# Patient Record
Sex: MALE | Race: WHITE | Employment: FULL TIME | URBAN - NONMETROPOLITAN AREA
[De-identification: names, ages, dates, MRNs, and addresses within clinical notes are randomized per-mention and may not be internally consistent; named-entity substitution may affect disease eponyms.]

---

## 2017-02-10 ENCOUNTER — OFFICE VISIT (OUTPATIENT)
Dept: OTHER | Age: 27
End: 2017-02-10

## 2017-02-10 ENCOUNTER — HOSPITAL ENCOUNTER (OUTPATIENT)
Dept: OTHER | Age: 27
Discharge: HOME | End: 2017-02-10
Attending: NURSE PRACTITIONER | Admitting: NURSE PRACTITIONER

## 2017-02-10 VITALS — OXYGEN SATURATION: 98 % | HEART RATE: 101 BPM | DIASTOLIC BLOOD PRESSURE: 57 MMHG | SYSTOLIC BLOOD PRESSURE: 129 MMHG

## 2017-02-10 DIAGNOSIS — E66.01 MORBID OBESITY WITH BODY MASS INDEX (BMI) OF 50.0 TO 59.9 IN ADULT (HCC): Primary | ICD-10-CM

## 2017-02-10 DIAGNOSIS — R06.83 SNORING: ICD-10-CM

## 2017-02-10 DIAGNOSIS — Z00.00 ENCOUNTER FOR PREVENTIVE HEALTH EXAMINATION: ICD-10-CM

## 2017-02-10 PROCEDURE — 99205 OFFICE O/P NEW HI 60 MIN: CPT | Performed by: NURSE PRACTITIONER

## 2017-02-10 ASSESSMENT — ENCOUNTER SYMPTOMS
DIARRHEA: 0
EYE REDNESS: 0
NAUSEA: 0
EYE ITCHING: 0
TROUBLE SWALLOWING: 0
STRIDOR: 0
VOMITING: 0
SINUS PRESSURE: 0
ABDOMINAL PAIN: 0
RESPIRATORY NEGATIVE: 1
CONSTIPATION: 0
SORE THROAT: 0
BACK PAIN: 1
GASTROINTESTINAL NEGATIVE: 1
EYES NEGATIVE: 1
COUGH: 0
WHEEZING: 0
EYE PAIN: 0
EYE DISCHARGE: 0

## 2017-03-20 ENCOUNTER — TELEPHONE (OUTPATIENT)
Dept: OTHER | Age: 27
End: 2017-03-20

## 2017-03-20 DIAGNOSIS — E66.01 MORBID OBESITY, UNSPECIFIED OBESITY TYPE (HCC): Primary | ICD-10-CM

## 2017-03-20 DIAGNOSIS — E66.01 MORBID OBESITY WITH BODY MASS INDEX (BMI) OF 50.0 TO 59.9 IN ADULT (HCC): ICD-10-CM

## 2017-03-21 ENCOUNTER — HOSPITAL ENCOUNTER (OUTPATIENT)
Dept: LAB | Age: 27
Discharge: HOME | End: 2017-03-21
Attending: NURSE PRACTITIONER | Admitting: NURSE PRACTITIONER

## 2017-03-21 LAB
ALBUMIN SERPL-MCNC: 4 GM/DL (ref 3.4–5)
ALP BLD-CCNC: 83 IU/L (ref 40–129)
ALT SERPL-CCNC: 80 U/L (ref 10–40)
ANION GAP SERPL CALCULATED.3IONS-SCNC: 12 MMOL/L (ref 4–16)
AST SERPL-CCNC: 50 IU/L (ref 15–37)
BASOPHILS ABSOLUTE: 0 K/CU MM
BASOPHILS RELATIVE PERCENT: 0.5 % (ref 0–1)
BILIRUB SERPL-MCNC: 0.5 MG/DL (ref 0–1)
BUN BLDV-MCNC: 8 MG/DL (ref 6–23)
CALCIUM SERPL-MCNC: 9 MG/DL (ref 8.3–10.6)
CHLORIDE BLD-SCNC: 102 MMOL/L (ref 99–110)
CHOLESTEROL: 168 MG/DL
CO2: 24 MMOL/L (ref 21–32)
CREAT SERPL-MCNC: 0.8 MG/DL (ref 0.9–1.3)
DIFFERENTIAL TYPE: ABNORMAL
EOSINOPHILS ABSOLUTE: 0.2 K/CU MM
EOSINOPHILS RELATIVE PERCENT: 2.8 % (ref 0–3)
ESTIMATED AVERAGE GLUCOSE: 117 MG/DL
GFR AFRICAN AMERICAN: >60 ML/MIN/1.73M2
GFR NON-AFRICAN AMERICAN: >60 ML/MIN/1.73M2
GLUCOSE BLD-MCNC: 96 MG/DL (ref 70–140)
HBA1C MFR BLD: 5.7 % (ref 4.2–6.3)
HCT VFR BLD CALC: 44.2 % (ref 42–52)
HDLC SERPL-MCNC: 36 MG/DL
HEMOGLOBIN: 14 GM/DL (ref 13.5–18)
HIV SCREEN: NON REACTIVE
IMMATURE NEUTROPHIL %: 0.5 % (ref 0–0.43)
LDL CHOLESTEROL DIRECT: 85 MG/DL
LYMPHOCYTES ABSOLUTE: 2.2 K/CU MM
LYMPHOCYTES RELATIVE PERCENT: 38.1 % (ref 24–44)
MCH RBC QN AUTO: 29.3 PG (ref 27–31)
MCHC RBC AUTO-ENTMCNC: 31.7 % (ref 32–36)
MCV RBC AUTO: 92.5 FL (ref 78–100)
MONOCYTES ABSOLUTE: 0.4 K/CU MM
MONOCYTES RELATIVE PERCENT: 6.9 % (ref 0–4)
PDW BLD-RTO: 13.5 % (ref 11.7–14.9)
PLATELET # BLD: 185 K/CU MM (ref 140–440)
PMV BLD AUTO: 10.7 FL (ref 7.5–11.1)
POTASSIUM SERPL-SCNC: 4.4 MMOL/L (ref 3.5–5.1)
RBC # BLD: 4.78 M/CU MM (ref 4.6–6.2)
SEGMENTED NEUTROPHILS ABSOLUTE COUNT: 2.9 K/CU MM
SEGMENTED NEUTROPHILS RELATIVE PERCENT: 51.2 % (ref 36–66)
SODIUM BLD-SCNC: 138 MMOL/L (ref 135–145)
T3 FREE: 3.3 PG/ML (ref 2.3–4.2)
T4 FREE: 1.09 NG/DL (ref 0.9–1.8)
TOTAL IMMATURE NEUTOROPHIL: 0.03 K/CU MM
TOTAL PROTEIN: 7.3 GM/DL (ref 6.4–8.2)
TRIGL SERPL-MCNC: 239 MG/DL
TSH HIGH SENSITIVITY: 2.52 UIU/ML (ref 0.27–4.2)
WBC # BLD: 5.6 K/CU MM (ref 4–10.5)

## 2017-03-23 ENCOUNTER — HOSPITAL ENCOUNTER (OUTPATIENT)
Dept: OTHER | Age: 27
Discharge: HOME | End: 2017-03-23
Attending: NURSE PRACTITIONER | Admitting: NURSE PRACTITIONER

## 2017-03-23 ENCOUNTER — OFFICE VISIT (OUTPATIENT)
Dept: OTHER | Age: 27
End: 2017-03-23

## 2017-03-23 VITALS — OXYGEN SATURATION: 96 % | DIASTOLIC BLOOD PRESSURE: 63 MMHG | SYSTOLIC BLOOD PRESSURE: 135 MMHG | HEART RATE: 84 BPM

## 2017-03-23 DIAGNOSIS — L03.115 CELLULITIS OF RIGHT LOWER EXTREMITY: ICD-10-CM

## 2017-03-23 DIAGNOSIS — R74.8 ELEVATED LIVER ENZYMES: Primary | ICD-10-CM

## 2017-03-23 DIAGNOSIS — E78.5 DYSLIPIDEMIA: ICD-10-CM

## 2017-03-23 DIAGNOSIS — Z71.2 ENCOUNTER TO DISCUSS TEST RESULTS: ICD-10-CM

## 2017-03-23 PROCEDURE — 99214 OFFICE O/P EST MOD 30 MIN: CPT | Performed by: NURSE PRACTITIONER

## 2017-03-23 RX ORDER — AMOXICILLIN AND CLAVULANATE POTASSIUM 875; 125 MG/1; MG/1
1 TABLET, FILM COATED ORAL 2 TIMES DAILY
Qty: 20 TABLET | Refills: 0 | Status: SHIPPED | OUTPATIENT
Start: 2017-03-23 | End: 2017-04-02

## 2017-03-23 ASSESSMENT — ENCOUNTER SYMPTOMS
GASTROINTESTINAL NEGATIVE: 1
RESPIRATORY NEGATIVE: 1
EYES NEGATIVE: 1
ALLERGIC/IMMUNOLOGIC NEGATIVE: 1
COLOR CHANGE: 1

## 2017-03-30 ENCOUNTER — HOSPITAL ENCOUNTER (OUTPATIENT)
Dept: CT IMAGING | Age: 27
Discharge: HOME | End: 2017-03-30
Attending: NURSE PRACTITIONER | Admitting: NURSE PRACTITIONER

## 2017-03-30 ENCOUNTER — TELEPHONE (OUTPATIENT)
Dept: OTHER | Age: 27
End: 2017-03-30

## 2017-03-30 DIAGNOSIS — R74.8 ABNORMAL LEVELS OF OTHER SERUM ENZYMES: ICD-10-CM

## 2017-03-30 DIAGNOSIS — R74.8 OTHER NONSPECIFIC ABNORMAL SERUM ENZYME LEVELS: ICD-10-CM

## 2019-05-16 ENCOUNTER — HOSPITAL ENCOUNTER (OUTPATIENT)
Age: 29
Discharge: HOME | End: 2019-05-16

## 2019-05-16 ENCOUNTER — HOSPITAL ENCOUNTER (OUTPATIENT)
Dept: GENERAL RADIOLOGY | Age: 29
Discharge: HOME | End: 2019-05-16

## 2019-05-16 DIAGNOSIS — S93.401A SPRAIN OF RIGHT ANKLE, UNSPECIFIED LIGAMENT, INITIAL ENCOUNTER: ICD-10-CM

## 2019-05-16 PROCEDURE — 73610 X-RAY EXAM OF ANKLE: CPT

## 2020-12-01 ENCOUNTER — HOSPITAL ENCOUNTER (OUTPATIENT)
Age: 30
Setting detail: OBSERVATION
Discharge: HOME | End: 2020-12-02
Attending: INTERNAL MEDICINE | Admitting: INTERNAL MEDICINE
Payer: COMMERCIAL

## 2020-12-01 ENCOUNTER — APPOINTMENT (OUTPATIENT)
Dept: GENERAL RADIOLOGY | Age: 30
End: 2020-12-01
Payer: COMMERCIAL

## 2020-12-01 PROBLEM — M25.561 RIGHT KNEE PAIN: Status: ACTIVE | Noted: 2020-12-01

## 2020-12-01 LAB
ALBUMIN SERPL-MCNC: 3.9 GM/DL (ref 3.4–5)
ALP BLD-CCNC: 96 IU/L (ref 40–129)
ALT SERPL-CCNC: 60 U/L (ref 10–40)
ANION GAP SERPL CALCULATED.3IONS-SCNC: 12 MMOL/L (ref 4–16)
AST SERPL-CCNC: 37 IU/L (ref 15–37)
BASOPHILS ABSOLUTE: 0.1 K/CU MM
BASOPHILS RELATIVE PERCENT: 0.6 % (ref 0–1)
BILIRUB SERPL-MCNC: 0.6 MG/DL (ref 0–1)
BUN BLDV-MCNC: 11 MG/DL (ref 6–23)
CALCIUM SERPL-MCNC: 9 MG/DL (ref 8.3–10.6)
CHLORIDE BLD-SCNC: 99 MMOL/L (ref 99–110)
CO2: 25 MMOL/L (ref 21–32)
CREAT SERPL-MCNC: 0.9 MG/DL (ref 0.9–1.3)
DIFFERENTIAL TYPE: ABNORMAL
EOSINOPHILS ABSOLUTE: 0.3 K/CU MM
EOSINOPHILS RELATIVE PERCENT: 3.1 % (ref 0–3)
GFR AFRICAN AMERICAN: >60 ML/MIN/1.73M2
GFR NON-AFRICAN AMERICAN: >60 ML/MIN/1.73M2
GLUCOSE BLD-MCNC: 98 MG/DL (ref 70–99)
HCT VFR BLD CALC: 45 % (ref 42–52)
HEMOGLOBIN: 14.3 GM/DL (ref 13.5–18)
IMMATURE NEUTROPHIL %: 0.6 % (ref 0–0.43)
LYMPHOCYTES ABSOLUTE: 2.5 K/CU MM
LYMPHOCYTES RELATIVE PERCENT: 27.8 % (ref 24–44)
MCH RBC QN AUTO: 29.4 PG (ref 27–31)
MCHC RBC AUTO-ENTMCNC: 31.8 % (ref 32–36)
MCV RBC AUTO: 92.6 FL (ref 78–100)
MONOCYTES ABSOLUTE: 0.6 K/CU MM
MONOCYTES RELATIVE PERCENT: 6.8 % (ref 0–4)
NUCLEATED RBC %: 0 %
PDW BLD-RTO: 13.1 % (ref 11.7–14.9)
PLATELET # BLD: 222 K/CU MM (ref 140–440)
PMV BLD AUTO: 11 FL (ref 7.5–11.1)
POTASSIUM SERPL-SCNC: 4.1 MMOL/L (ref 3.5–5.1)
RBC # BLD: 4.86 M/CU MM (ref 4.6–6.2)
SEGMENTED NEUTROPHILS ABSOLUTE COUNT: 5.5 K/CU MM
SEGMENTED NEUTROPHILS RELATIVE PERCENT: 61.1 % (ref 36–66)
SODIUM BLD-SCNC: 136 MMOL/L (ref 135–145)
TOTAL IMMATURE NEUTOROPHIL: 0.05 K/CU MM
TOTAL NUCLEATED RBC: 0 K/CU MM
TOTAL PROTEIN: 7.3 GM/DL (ref 6.4–8.2)
WBC # BLD: 9 K/CU MM (ref 4–10.5)

## 2020-12-01 PROCEDURE — 85025 COMPLETE CBC W/AUTO DIFF WBC: CPT

## 2020-12-01 PROCEDURE — 6370000000 HC RX 637 (ALT 250 FOR IP): Performed by: PHYSICIAN ASSISTANT

## 2020-12-01 PROCEDURE — 6360000002 HC RX W HCPCS: Performed by: PHYSICIAN ASSISTANT

## 2020-12-01 PROCEDURE — 80053 COMPREHEN METABOLIC PANEL: CPT

## 2020-12-01 PROCEDURE — 73564 X-RAY EXAM KNEE 4 OR MORE: CPT

## 2020-12-01 PROCEDURE — G0378 HOSPITAL OBSERVATION PER HR: HCPCS

## 2020-12-01 PROCEDURE — 96372 THER/PROPH/DIAG INJ SC/IM: CPT

## 2020-12-01 PROCEDURE — 99285 EMERGENCY DEPT VISIT HI MDM: CPT

## 2020-12-01 PROCEDURE — 73590 X-RAY EXAM OF LOWER LEG: CPT

## 2020-12-01 RX ORDER — IBUPROFEN 400 MG/1
400 TABLET ORAL EVERY 6 HOURS PRN
Status: DISCONTINUED | OUTPATIENT
Start: 2020-12-01 | End: 2020-12-02 | Stop reason: HOSPADM

## 2020-12-01 RX ORDER — HYDROCODONE BITARTRATE AND ACETAMINOPHEN 5; 325 MG/1; MG/1
2 TABLET ORAL EVERY 4 HOURS PRN
Status: DISCONTINUED | OUTPATIENT
Start: 2020-12-01 | End: 2020-12-02 | Stop reason: HOSPADM

## 2020-12-01 RX ORDER — LIDOCAINE 4 G/G
1 PATCH TOPICAL ONCE
Status: COMPLETED | OUTPATIENT
Start: 2020-12-01 | End: 2020-12-02

## 2020-12-01 RX ORDER — POLYETHYLENE GLYCOL 3350 17 G/17G
17 POWDER, FOR SOLUTION ORAL DAILY PRN
Status: DISCONTINUED | OUTPATIENT
Start: 2020-12-01 | End: 2020-12-02 | Stop reason: HOSPADM

## 2020-12-01 RX ORDER — LIDOCAINE 4 G/G
1 PATCH TOPICAL DAILY
Status: DISCONTINUED | OUTPATIENT
Start: 2020-12-01 | End: 2020-12-02 | Stop reason: HOSPADM

## 2020-12-01 RX ORDER — HYDROCODONE BITARTRATE AND ACETAMINOPHEN 5; 325 MG/1; MG/1
1 TABLET ORAL EVERY 4 HOURS PRN
Status: DISCONTINUED | OUTPATIENT
Start: 2020-12-01 | End: 2020-12-02 | Stop reason: HOSPADM

## 2020-12-01 RX ORDER — ACETAMINOPHEN 650 MG/1
650 SUPPOSITORY RECTAL EVERY 6 HOURS PRN
Status: DISCONTINUED | OUTPATIENT
Start: 2020-12-01 | End: 2020-12-02 | Stop reason: HOSPADM

## 2020-12-01 RX ORDER — KETOROLAC TROMETHAMINE 30 MG/ML
30 INJECTION, SOLUTION INTRAMUSCULAR; INTRAVENOUS ONCE
Status: COMPLETED | OUTPATIENT
Start: 2020-12-01 | End: 2020-12-01

## 2020-12-01 RX ORDER — PROMETHAZINE HYDROCHLORIDE 25 MG/1
12.5 TABLET ORAL EVERY 6 HOURS PRN
Status: DISCONTINUED | OUTPATIENT
Start: 2020-12-01 | End: 2020-12-02 | Stop reason: HOSPADM

## 2020-12-01 RX ORDER — HYDROCODONE BITARTRATE AND ACETAMINOPHEN 5; 325 MG/1; MG/1
1 TABLET ORAL ONCE
Status: COMPLETED | OUTPATIENT
Start: 2020-12-01 | End: 2020-12-01

## 2020-12-01 RX ORDER — ACETAMINOPHEN 325 MG/1
650 TABLET ORAL EVERY 6 HOURS PRN
Status: DISCONTINUED | OUTPATIENT
Start: 2020-12-01 | End: 2020-12-02 | Stop reason: HOSPADM

## 2020-12-01 RX ORDER — ONDANSETRON 2 MG/ML
4 INJECTION INTRAMUSCULAR; INTRAVENOUS EVERY 6 HOURS PRN
Status: DISCONTINUED | OUTPATIENT
Start: 2020-12-01 | End: 2020-12-02 | Stop reason: HOSPADM

## 2020-12-01 RX ADMIN — HYDROCODONE BITARTRATE AND ACETAMINOPHEN 1 TABLET: 5; 325 TABLET ORAL at 20:17

## 2020-12-01 RX ADMIN — KETOROLAC TROMETHAMINE 30 MG: 30 INJECTION, SOLUTION INTRAMUSCULAR; INTRAVENOUS at 19:03

## 2020-12-01 ASSESSMENT — PAIN SCALES - GENERAL
PAINLEVEL_OUTOF10: 10

## 2020-12-01 ASSESSMENT — PAIN DESCRIPTION - PAIN TYPE: TYPE: ACUTE PAIN

## 2020-12-01 ASSESSMENT — PAIN DESCRIPTION - LOCATION: LOCATION: KNEE

## 2020-12-01 ASSESSMENT — PAIN DESCRIPTION - ORIENTATION: ORIENTATION: RIGHT

## 2020-12-01 NOTE — ED PROVIDER NOTES
EMERGENCY DEPARTMENT ENCOUNTER      PCP: JENIFER Greer CNP    CHIEF COMPLAINT    Chief Complaint   Patient presents with    Fall     slipped on ice yesterday     Knee Pain     right        This patient was not evaluated by the attending physician. I have independently evaluated this patient. My supervising physician was available for consultation. HPI    Kirstin Ko is a 30 y.o. male who presents with Right knee pain after fall. Onset was yesterday. The context is patient reports he slipped on ice yesterday landing on both his knees. He was able to get up on his own after the fall. He denies hitting his head and denies having loss of consciousness. Patient reports that although he struck both knees he only is having pain in his right knee. Fall occurred yesterday afternoon. Patient reports that pain has been localized to the front and right side of right knee but does occasionally radiate into the mid shin region of his right lower leg. He has not tried any medication for pain. Duration of pain is been constant since onset. Today he was sitting most of the day when he tried to get up to walk around noon she was unable to walk and is only able to bear weight for small amount of time on his own. He called EMS and came to the ED for further evaluation. Patient denies prior history of right knee injury. The pain is aggravated by trying to ambulate and knee movement. The patient has no associated other injuries. Patient denies numbness, weakness, tingling, redness, fever, chills. Patient denies taking blood thinners. REVIEW OF SYSTEMS    General: Denies fever or chills  Cardiac: Denies chest pain  Pulmonary: Denies shortness of breath  GI: Denies abdominal pain, vomiting, or diarrhea  : No dysuria or hematuria  Musculoskeletal: See HPI; Denies any other musculoskeletal injuries, including chest wall and back.     All other review of systems are negative  See HPI and nursing notes for additional information     PAST MEDICAL & SURGICAL HISTORY    Past Medical History:   Diagnosis Date    Abscess     ADHD (attention deficit hyperactivity disorder)     Chronic back pain     Morbid obesity ([address]     Scoliosis     6th grade     Past Surgical History:   Procedure Laterality Date    FOOT SURGERY         CURRENT MEDICATIONS    Current Outpatient Rx   Medication Sig Dispense Refill    naproxen (NAPROSYN) 500 MG tablet Take 1 tablet by mouth 2 times daily 60 tablet 0       ALLERGIES    Allergies   Allergen Reactions    Cinnamon Shortness Of Breath       SOCIAL & FAMILY HISTORY    Social History     Socioeconomic History    Marital status: Single     Spouse name: None    Number of children: None    Years of education: None    Highest education level: None   Occupational History    None   Social Needs    Financial resource strain: None    Food insecurity     Worry: None     Inability: None    Transportation needs     Medical: None     Non-medical: None   Tobacco Use    Smoking status: Never Smoker    Smokeless tobacco: Current User     Types: Snuff   Substance and Sexual Activity    Alcohol use: Yes     Comment: occ    Drug use: No    Sexual activity: Yes     Partners: Female   Lifestyle    Physical activity     Days per week: None     Minutes per session: None    Stress: None   Relationships    Social connections     Talks on phone: None     Gets together: None     Attends Confucianist service: None     Active member of club or organization: None     Attends meetings of clubs or organizations: None     Relationship status: None    Intimate partner violence     Fear of current or ex partner: None     Emotionally abused: None     Physically abused: None     Forced sexual activity: None   Other Topics Concern    None   Social History Narrative    None     Family History   Problem Relation Age of Onset    Hypertension Mother     Obesity Mother         had gastric bypass     Diabetes Father     Obesity Father     Asthma Brother     High Blood Pressure Maternal Grandmother     Diabetes Maternal Grandfather     Arthritis Maternal Grandfather     Stroke Paternal Grandmother     Heart Attack Paternal Grandmother     No Known Problems Paternal Grandfather            PHYSICAL EXAM    VITAL SIGNS: /82   Pulse 92   Temp 97.2 °F (36.2 °C)   Resp 20   Ht 6' 5\" (1.956 m)   Wt (!) 540 lb (244.9 kg)   SpO2 96%   BMI 64.03 kg/m²   Constitutional:  Well developed, Appears comfortable    Musculoskeletal:    Right knee exam:   -Inspection:  No obvious defects or deformities compared to contralateral knee, skin intact   - Palpation: No redness, or warmth     No palpable effusion but exam is difficult secondary to patient's body habitus     + lateral joint line tenderness to palpation. No medial joint line tenderness to palpation. + parapatellar and patellar tenderness to palpation. No pes region, popliteal tenderness to palpation. There is also mild tenderness to palpation of the proximal right tibia and fibula without point tenderness of the tibia or fibula. There is no mid to distal tibia or fibula tenderness to palpation. -ROM:  Extension - Has full extension (Extensor mechanism intact)    Flexion -significantly limited due to pain   -Provocative tests: Unable to perform anterior/posterior drawer and varus valgus laxity testing as well as Urmila's test due to patient's decreased range of motion. No swelling, discoloration, tenderness to palpation of lower leg, or range of motion deficit of the ipsilateral hip and ankle. Compartments are soft in affected extremity. Strength 5/5 in affected extremity. Affected extremity is distally neurovascularly intact. Cardiovascular: Regular rate  Respiratory: No tachypnea. No retractions. No distress. Vascular: Distal pulses (DP, PT) intact on affected side. Capillary refill intact.   Integument:  Well hydrated, no rash COURSE & MEDICAL DECISION MAKING       Vital signs and nursing notes reviewed during ED course. I have independently evaluated this patient. Supervising physician present in the Emergency Department, available for consultation, throughout entirety of patient care. History and exam is consistent with musculoskeletal pain of right knee after mechanical fall likely internal derangement vs contusion. While in the ED today, right knee xray and right tibia and fibula x-ray were obtained and reveal no acute osseous abnormality. While in the ED, patient received IM Toradol, topical lidocaine patch, and oral Norco with some improvement in pain but is still unable to ambulate. Affected extremity is distally neurovascularly intact. I have low clinical suspicion for septic joint, nerve injury, vascular injury. Compartments are soft in the right lower extremity-low clinical suspicion for compartment syndrome. Presentation not consistent with DVT. Case management consulted as patient unable to ambulate in the ED to try to help with discharge planning as due to patient's weight he is unable to utilize crutches or walker due to weight limit. Case management is unable to obtain a wheelchair for patient tonight and recommends admission. Labs without emergent findings. I consulted hospitalist, Dr. Andres Schultz, and we discussed the patient's case. He agrees to admit the patient at this time for further care. All pertinent Lab data and radiographic results reviewed with patient at bedside. The patient and/or the family were informed of the results of any tests/labs/imaging, the treatment plan, and time was allotted to answer questions. Diagnosis, disposition, and plan discussed in detail with patient and/or family who understands and agrees to admission at this time.       In consideration of current COVID19 pandemic, with effort to minimize unnecessary provider exposure, this patient was seen at bedside by me independently. However, in compliance with current hospital KARLA/ED protocol, prior to admission I did discuss this patient case with emergency department physician, Dr. Anthony Tejada. Of note, this Pt was NOT admitted to the ICU. I did don/doff appropriate PPE (including face mask, safety glasses, gloves), as recommended by the health facility/national standard best practice, during my bedside interactions with the patient. Clinical  IMPRESSION    1. Acute pain of right knee    2. Fall due to slipping on ice or snow, initial encounter    3. Unable to ambulate          Disposition:  Admission    Comment: Please note this report has been produced using speech recognition software and may contain errors related to that system including errors in grammar, punctuation, and spelling, as well as words and phrases that may be inappropriate. If there are any questions or concerns please feel free to contact the dictating provider for clarification.           Germain Vazquez PA-C  12/01/20 6909

## 2020-12-02 VITALS
DIASTOLIC BLOOD PRESSURE: 65 MMHG | RESPIRATION RATE: 18 BRPM | TEMPERATURE: 97.7 F | HEIGHT: 77 IN | SYSTOLIC BLOOD PRESSURE: 140 MMHG | WEIGHT: 315 LBS | BODY MASS INDEX: 37.19 KG/M2 | OXYGEN SATURATION: 96 % | HEART RATE: 87 BPM

## 2020-12-02 PROBLEM — R26.2 AMBULATORY DYSFUNCTION: Status: ACTIVE | Noted: 2020-12-02

## 2020-12-02 PROCEDURE — 6370000000 HC RX 637 (ALT 250 FOR IP): Performed by: STUDENT IN AN ORGANIZED HEALTH CARE EDUCATION/TRAINING PROGRAM

## 2020-12-02 PROCEDURE — 97162 PT EVAL MOD COMPLEX 30 MIN: CPT

## 2020-12-02 PROCEDURE — 97166 OT EVAL MOD COMPLEX 45 MIN: CPT

## 2020-12-02 PROCEDURE — 97116 GAIT TRAINING THERAPY: CPT

## 2020-12-02 PROCEDURE — G0378 HOSPITAL OBSERVATION PER HR: HCPCS

## 2020-12-02 PROCEDURE — 97530 THERAPEUTIC ACTIVITIES: CPT

## 2020-12-02 PROCEDURE — 6360000002 HC RX W HCPCS: Performed by: STUDENT IN AN ORGANIZED HEALTH CARE EDUCATION/TRAINING PROGRAM

## 2020-12-02 PROCEDURE — 96372 THER/PROPH/DIAG INJ SC/IM: CPT

## 2020-12-02 RX ORDER — IBUPROFEN 400 MG/1
400 TABLET ORAL EVERY 8 HOURS PRN
Qty: 120 TABLET | Refills: 1 | Status: SHIPPED | OUTPATIENT
Start: 2020-12-02

## 2020-12-02 RX ORDER — IBUPROFEN 400 MG/1
400 TABLET ORAL EVERY 8 HOURS PRN
Qty: 120 TABLET | Refills: 1 | Status: SHIPPED | OUTPATIENT
Start: 2020-12-02 | End: 2020-12-02

## 2020-12-02 RX ORDER — LIDOCAINE 4 G/G
1 PATCH TOPICAL DAILY
Qty: 20 PATCH | Refills: 0 | Status: SHIPPED | OUTPATIENT
Start: 2020-12-03 | End: 2020-12-23

## 2020-12-02 RX ORDER — LIDOCAINE 4 G/G
1 PATCH TOPICAL DAILY
Qty: 20 PATCH | Refills: 0 | Status: SHIPPED | OUTPATIENT
Start: 2020-12-03 | End: 2020-12-02

## 2020-12-02 RX ORDER — HYDROCODONE BITARTRATE AND ACETAMINOPHEN 5; 325 MG/1; MG/1
1 TABLET ORAL EVERY 4 HOURS PRN
Qty: 12 TABLET | Refills: 0 | Status: SHIPPED | OUTPATIENT
Start: 2020-12-02 | End: 2020-12-05

## 2020-12-02 RX ORDER — NAPROXEN 500 MG/1
500 TABLET ORAL 2 TIMES DAILY WITH MEALS
Qty: 30 TABLET | Refills: 0 | Status: CANCELLED | OUTPATIENT
Start: 2020-12-02

## 2020-12-02 RX ADMIN — HYDROCODONE BITARTRATE AND ACETAMINOPHEN 2 TABLET: 5; 325 TABLET ORAL at 12:47

## 2020-12-02 RX ADMIN — ENOXAPARIN SODIUM 40 MG: 40 INJECTION SUBCUTANEOUS at 12:46

## 2020-12-02 RX ADMIN — HYDROCODONE BITARTRATE AND ACETAMINOPHEN 2 TABLET: 5; 325 TABLET ORAL at 04:10

## 2020-12-02 ASSESSMENT — PAIN SCALES - GENERAL
PAINLEVEL_OUTOF10: 7
PAINLEVEL_OUTOF10: 7
PAINLEVEL_OUTOF10: 4
PAINLEVEL_OUTOF10: 8

## 2020-12-02 ASSESSMENT — ENCOUNTER SYMPTOMS
CHEST TIGHTNESS: 0
NAUSEA: 0
RHINORRHEA: 0
WHEEZING: 0
DIARRHEA: 0
COUGH: 0
SORE THROAT: 0
COLOR CHANGE: 0
ABDOMINAL PAIN: 0
SHORTNESS OF BREATH: 0
VOMITING: 0

## 2020-12-02 ASSESSMENT — PAIN DESCRIPTION - FREQUENCY: FREQUENCY: CONTINUOUS

## 2020-12-02 ASSESSMENT — PAIN DESCRIPTION - LOCATION: LOCATION: KNEE

## 2020-12-02 ASSESSMENT — PAIN DESCRIPTION - ORIENTATION: ORIENTATION: RIGHT

## 2020-12-02 ASSESSMENT — PAIN DESCRIPTION - PAIN TYPE: TYPE: ACUTE PAIN

## 2020-12-02 ASSESSMENT — PAIN DESCRIPTION - ONSET: ONSET: ON-GOING

## 2020-12-02 NOTE — CARE COORDINATION
CM met w/ pt for discharge planning. Pt is from home w/ his wife and was injured while walking to his truck. Pt works as a . Pt states he has only been working for 3 days and does not have health insurance at this time. Pt needs a bariatric walker but this CM called [facility], [facility] and [facility] who all do not carry a bariatric walker to accommodate his weight. CM spoke with CM director, Janeal Dance and PT manager Lainey Sebastian who stated that patient can be discharged with the bariatric walker therapy supplied him to use while admitted. PT/OT have worked with pt and he has been instructed on the use of this walker and demonstrates understanding. Pt would like his discharge medications sent to the [facility] serve sent to Dr. Kirit Connor with pt request. Pt states he has an account there. Therapy in room with pt who stated they would send exercises home w/ pt to perform since he has no insurance he will not be able to have home health care.

## 2020-12-02 NOTE — PROGRESS NOTES
Hospitalist Progress Note      Name:  Antonia Rhodes /Age/Sex: 1990  (30 y.o. male)   MRN & CSN:  [id number] & [id number] Admission Date/Time: 2020  6:14 PM   Location:  [facility]0/1110A PCP: Maurizio Priest 112 Day: 2    Assessment and Plan:   Antonia Rhodes is a 30 y.o.  male  who presents with Right knee pain     1. Right knee pain  2. Inability to bear weight  3. Ambulatory dysfunction  · Consult to Case management for DME with heavy wheelchair. Secondary to patient's morbid obesity he is unable to use crutches and standard wheelchair will not hold weight. · Oral pain control  · PT/OT on board  · No acute fractures or dislocation noted of right knee, tibia and fibula. No joint effusion noted.     4. Morbid obesity with BMI of 64.03  · Recommend aggressive lifestyle modifications given morbid obesity and sedentary lifestyle. Diet DIET GENERAL;   DVT Prophylaxis [] Lovenox, []  Heparin, [] SCDs, []No VTE prophylaxis, patient ambulating   GI Prophylaxis [] PPI, [] H2 Blocker, [] No GI prophylaxis, patient is receiving diet/Tube Feeds   Code Status Full Code   Disposition Patient requires continued admission due to    MDM [] Low, [] Moderate,[]  High  Patient's risk as above due to      History of Present Illness:     Pt S&E. Seen in ED, Pain still persistent in right knee, improves with pain meds. Difficulty with walking, notes PT came to work with him    10-14 point ROS reviewed negative, unless as noted above    Objective:   No intake or output data in the 24 hours ending 20 1335   Vitals:   Vitals:    20 1320   BP: (!) 140/65   Pulse: 87   Resp: 18   Temp: 97.7 °F (36.5 °C)   SpO2: 96%     Physical Exam:    GEN Awake male, sitting upright in bed in no apparent distress. Appears given age. EYES Pupils are equally round. No scleral erythema, discharge, or conjunctivitis.   HENT Mucous membranes are moist.   NECK No apparent thyromegaly or masses. RESP Clear to auscultation, no wheezes, rales or rhonchi. Symmetric chest movement while on room air. CARDIO/VASC S1/S2 auscultated. Regular rate without appreciable murmurs, rubs, or gallops. Peripheral pulses equal bilaterally and palpable. No peripheral edema. GI Abdomen is soft without significant tenderness, masses, or guarding. Bowel sounds are normoactive. Rectal exam deferred.  Luu catheter is not present. HEME/LYMPH No petechiae or ecchymoses. MSK No gross joint deformities. Spontaneous movement of all extremities  SKIN Normal coloration, warm, dry. NEURO Cranial nerves appear grossly intact, normal speech, no lateralizing weakness. PSYCH Awake, alert, oriented x 4. Affect appropriate. Medications:   Medications:    enoxaparin  40 mg Subcutaneous Daily    lidocaine  1 patch Transdermal Daily      Infusions:   PRN Meds: acetaminophen, 650 mg, Q6H PRN    Or  acetaminophen, 650 mg, Q6H PRN  polyethylene glycol, 17 g, Daily PRN  promethazine, 12.5 mg, Q6H PRN    Or  ondansetron, 4 mg, Q6H PRN  ibuprofen, 400 mg, Q6H PRN  HYDROcodone 5 mg - acetaminophen, 1 tablet, Q4H PRN    Or  HYDROcodone 5 mg - acetaminophen, 2 tablet, Q4H PRN        Data    Recent Labs     12/01/20 2058   WBC 9.0   HGB 14.3   HCT 45.0         Recent Labs     12/01/20 2058      K 4.1   CL 99   CO2 25   BUN 11   CREATININE 0.9     Recent Labs     12/01/20 2058   AST 37   ALT 60*   BILITOT 0.6   ALKPHOS 96     No results for input(s): INR in the last 72 hours. No results for input(s): CKTOTAL, CKMB, CKMBINDEX, TROPONINI in the last 72 hours.         Electronically signed by Feliberto Escalera MD on 12/2/2020 at 1:35 PM

## 2020-12-02 NOTE — ED NOTES
Bed: ED-31  Expected date:   Expected time:   Means of arrival:   Comments:  meseret [id number] Eliezer Villar RN  12/02/20 2140

## 2020-12-02 NOTE — ED NOTES
Pt stood at the bedside with this nurse and was a little unstable. Pt states it still hurt bad to stand up and trying to take tiny steps and he couldn't stop holding the wall.       Brenden Pruett RN  12/01/20 2124

## 2020-12-02 NOTE — PROGRESS NOTES
CLINICAL PHARMACY NOTE: MEDS TO [address] Patient?: No  Total # of Prescriptions Filled: 1   The following medications were delivered to the patient:  · Hydrocodone/apap 5/325mg  Total # of Interventions Completed: 1  Time Spent (min): 30    Additional Documentation:  He does not have active prescription insurance. He purchased the lidocaine 4% patch and ibuprofen 200mg from our otc selection.

## 2020-12-02 NOTE — CARE COORDINATION
CM received consult from Dahiana Ortiz to assist with discharge planning. CM met with patient to begin discharge planning. CM introduced self and CM role. Patient states he presents to ER with c/o right knee pain following a fall. Patient states he is employed as a  with [employer]. Patient states he fell last night at 2145 attempting to get into passenger side of truck. Patient states he was passenger for 4 hours until his turn to drive. Patient states after their layover waiting for truck to be unloaded it was his turn to drive. Patient states he drove for 5 hours and upon reaching destination, was unable to bear weight on right leg. Patient states he had to \"crawl\" from truck to office. Patient states he contacted EMS for transportation to ER. Patient states has been able to stand on left leg, but unable to place any weight on right leg. Patient provided with pain medications while in the emergency department. CM discussed possible discharge vs admission. Patient states he would like to be discharged if able to ambulate. Primary nurse Lolly Saunders attempted to ambulate patient. Patient was able to stand, but was unable to take small steps. Patient c/o severe pain with weight bearing. Patient states he lives in [place] and has steps to get into home. Patient current weight 540 pounds. Patient unable to ambulate with crutches due to weight. Patient would like to obtain wheelchair for assistance. CM discussed DME companies with patient. Patient agreeable for [employer] for wheelchair. CM unable to obtain DME at this time. Patient plan return home with spouse upon discharge. Patient states his spouse is an STNA and can assist when necessary. CM attempted to leave message for [place]. Voicemail full. DME order placed.       Juliet Crespo was evaluated today and a DME order was entered for an extra heavy duty wheelchair because he requires this to successfully complete daily living tasks of eating, bathing, toileting, personal cares, ambulating, grooming, hygiene and meal preparation. An extra heavy duty manual wheelchair is necessary due to patient's impaired ambulation and mobility restrictions and would be unable to resolve these daily living tasks using a cane or walker. The patient is capable of using a standard wheelchair safely in their home and can maneuver within their home with adequate access. There is a caregiver available to provide necessary assistance. The need for this equipment was discussed with the patient and he understands, is in agreement, and has not expressed an unwillingness to use the wheelchair.

## 2020-12-02 NOTE — PROGRESS NOTES
Physical Therapy Treatment Note  Name: Janette Tuttle MRN: [id number] :   1990   Date:  2020   Admission Date: 2020 Room:  [facility]     Restrictions/Precautions:        general precautions    Communication with other providers:  RN, tech    Subjective:  Patient states:  \"Can I get cleaned up? \"  Pain:   Location, Type, Intensity (0/10 to 10/10):  6/10 R knee    Objective:    Observation:  Pt supine in bed upon entry. Treatment, including education/measures:  Bed mobility: Pt transferred supine to sitting EOB min A with assist at trunk. Transfers: Pt transferred STS from bed, shower chair x3, and chair CGA progressing to supervision with cues for hand placement. Gait: Pt ambulated 80' x2 with RW CGA with decreased yonatan, overreliance on UEs, and decreased stance phase on R LE. Pt required seated rest break during second bout after 40' due to pain and fatigue. Balance: Sitting: good; standing: fair+, pt stood while taking shower with tech supervision with no LOB  Stairs: Pt educated on proper step to pattern for 2 stairs to simulate home set up with proper walker placement as pt does not have HR. Pt performed with cues and CGA with increased time and effort to complete. Education: Pt educated on tub transfer bench, sock aide, reacher, step to gait pattern, stair negotiation, safe transfer technique, leg , HHC after discharge, AROM exercise program, walker management. Assessment / Impression:    Second session warranted this PM due to pt's same day discharge requiring necessary education and therapy in order to increase pt's safety at home. Pt with improved ambulation this session compared to eval. Pt sitting EOB bed at end of session with needs in reach and RN in room.      Patient's tolerance of treatment:  well   Adverse Reaction: n/a  Significant change in status and impact:  n/a  Barriers to improvement:  Increased pain    Plan for Next Session:    Continue to

## 2020-12-02 NOTE — PROGRESS NOTES
Pt. Shown how to use the bariatric walker properly, patient expressed understanding and demonstrates proper use of walker.

## 2020-12-02 NOTE — CONSULTS
[address] [facility],2Nd Floor ACUTE CARE OCCUPATIONAL THERAPY EVALUATION    Kristopher Soriano, 1990, 1110/1110-A, 12/2/2020    Discharge Recommendation: Home with initial 24 hour supervision/assistance, PRN assist.       History:  [place]:  The primary encounter diagnosis was Acute pain of right knee. Diagnoses of Fall due to slipping on ice or snow, initial encounter and Unable to ambulate were also pertinent to this visit. Subjective:  Patient states: Agreeable to therapy. Pain: Pt reports 7/10 pain in R knee. Communication with other providers: PT, RN, LSW  Restrictions: General Precautions, Fall Risk    Home Setup/Prior level of function:  Social/Functional History  Lives With: Spouse  Type of Home: House  Home Layout: One level  Home Access: Stairs to enter without rails  Entrance Stairs - Number of Steps: 2  Bathroom Shower/Tub: Tub/Shower unit  Bathroom Toilet: Standard  ADL Assistance: Independent  Homemaking Assistance: Independent  Homemaking Responsibilities: Yes  Ambulation Assistance: Independent  Transfer Assistance: Independent  Active : Yes  Occupation: Full time employment  Type of occupation:     Examination:  · Observation: Supine in bed upon arrival  · Vision: WFL  · Hearing: WFL  · Vitals: Stable vitals throughout session    Body Systems and functions:  · ROM: WFL   · Strength: 5/5 BUE shoulder flexion. · Sensation: WFL  · Tone: Normal  · Coordination: WFL  · Perception: WNL    Activities of Daily Living (ADLs):  · Feeding: Tato (setup)  · Grooming: CGA (anticipate pt could complete in standing with CGA for balance and intermittent rest breaks d/t pain and decreased activity tolerance. · UB bathing: Tato (setup and increased time)  · LB bathing: Carlos (anticipate assist for distal reaching, recommend pt complete in seated). · UB dressing: Tato (increased time and setup)  · LB dressing: Carlos (pt sat EOB and donned L sock with increased time.  Pt unable to fred R sock and required assist). · Toileting: CGA (anticipate CGA for transfers and while balancing to complete kaycee care/LB clothing manipulation in standing). Cognitive and Psychosocial Functioning:  · Overall cognitive status: WFL (self, time, location)  · Affect: Normal     Balance:   · Sitting: Supervision (pt sat EOB demo good dynamic sitting balance). · Standing: CGA (pt stood with RW, demo good overall balance, decreased WB on RLE, relies heavily on BUE for support. Functional Mobility:  · Bed Mobility: SBA (pt performed supine to seated bed mobility with VC throughout for sequencing and increased time). Carlos (seated to supine bed mobility was performed with RLE assist). · Transfers: CGA (STS from EOB with RW, VC throughout for sequencing, increased time). · Ambulation: CGA (pt ambulated approx 5ft with RW, demo slow pace, decreased ability to WB on RLE). AM-PAC 6 click short form for inpatient daily activity:   How much help from another person does the patient currently need. .. Unable  Dep A Lot  Max A A Lot   Mod A A Little  Min A A Little   CGA  SBA None   Mod I  Indep  Sup   1. Putting on and taking off regular lower body clothing? [] 1    [] 2   [] 2   [x] 3   [] 3   [] 4      2. Bathing (including washing, rinsing, drying)? [] 1   [] 2   [] 2 [] 3 [x] 3 [] 4   3. Toileting, which includes using toilet, bedpan, or urinal? [] 1    [] 2   [] 2   [] 3   [x] 3   [] 4     4. Putting on and taking off regular upper body clothing? [] 1   [] 2   [] 2   [] 3   [] 3    [x] 4      5. Taking care of personal grooming such as brushing teeth? [] 1   [] 2    [] 2 [] 3    [x] 3   [] 4      6. Eating meals? [] 1   [] 2   [] 2   [] 3   [] 3   [x] 4      Raw Score:  20     [24=0% impaired(CH), 23=1-19%(CI), 20-22=20-39%(CJ), 15-19=40-59%(CK), 10-14=60-79%(CL), 7-9=80-99%(CM), 6=100%(CN)]     Treatment:  Therapeutic Activity Training:   Therapeutic activity training was instructed today.   Cues were given for safety, sequence, UE/LE placement, awareness, and balance. Activities performed today included bed mobility training, sup-sit, sit-stand, ambulation. Safety Measures: Gait belt used, Left in bed, Alarm in place    Assessment:  Assessment  Performance deficits / Impairments: Decreased functional mobility   Treatment Diagnosis: R knee pain  Prognosis: Good  Decision Making: Medium Complexity  REQUIRES OT FOLLOW UP: No  Discharge Recommendations: Home with assist PRN     Pt is a 30year old F admitted for R knee pain. Pt reports that prior to admission he was independent in ADLs, ADLs, and functional mobility. This date, pt notes pain as a limiting factor for ability to engage in ADLs/IADLs as previous level. Pt educated on use of RW for ambulation and while balancing to perform ADLs in standing and utilizing a chair for ADLs/IADLs as pain persists. Pt is receptive and reports having good supports at home. OT recommends pt return home with initial 24 hour SUP/assist, PRN assist. No OT needs at this time. Time:   Time in: 1018  Time out: 1035  Timed treatment minutes: 8  Total time: 17      Electronically signed by:       JUAN Burciaga/L, [place], .[id number]

## 2020-12-02 NOTE — PROGRESS NOTES
Pt arrived on the floor alert and oriented. Complaining of right knee pain. VS as in chart. Pt ordered food per diet order.

## 2020-12-02 NOTE — PROGRESS NOTES
Physical Therapy  Prior to discharge, pt would benefit from bariatric RW as well as already ordered WC to decrease pt's fall risk in the home and community.

## 2020-12-02 NOTE — DISCHARGE SUMMARY
Discharge Summary    Name:  Becky Lawson /Age/Sex: 1990  (30 y.o. male)   MRN & CSN:  [id number] & [id number] Admission Date/Time: 2020  6:14 PM   Attending:  Ping Love MD Discharging Physician: Ping Love MD       Hospital Course:   Becky Lawson is a 30 y.o.  male  who presents with Right knee pain      1. Right knee pain  2. Inability to bear weight  3. Ambulatory dysfunction  · Consult to Case management .    · Oral pain control  · PT/OT on board, benefit from OP PT  · No acute fractures or dislocation noted of right knee, tibia and fibula.  No joint effusion noted.     4. Morbid obesity with BMI of 64.03  · Recommend aggressive lifestyle modifications given morbid obesity and sedentary lifestyle. Becky Lawson was evaluated today and a DME order was entered for a bariatric wheeled walker because he requires this to successfully complete daily living tasks of eating, bathing, toileting, personal cares, ambulating, grooming and hygiene.  A wheeled bariatric walker is necessary due to the patient's unsteady gait, upper body weakness, and inability to  an ambulation device; and he can ambulate only by pushing a walker instead of a lesser assistive device such as a cane, crutch, or standard walker.  The need for this equipment was discussed with the patient and he understands and is in agreement. [facility]  The patient expressed appropriate understanding of and agreement with the discharge recommendations, medications, and plan.      Consults this admission:  IP CONSULT TO CASE MANAGEMENT  IP CONSULT TO HOSPITALIST  IP CONSULT TO CASE MANAGEMENT  IP CONSULT TO HOME CARE NEEDS    Discharge Instruction:   Follow up appointments:pcp  Primary care physician:  within 2 weeks     Diet:  regular diet   Activity: activity as tolerated  Disposition: Discharged to:   []Home, [x]HHC, []SNF, []Acute Rehab, []Hospice   Condition on discharge: Stable    Discharge Medications:      Rios Phelps   Home Medication Instructions [id number]    Printed on:12/02/20 6590   Medication Information                      HYDROcodone-acetaminophen (NORCO) 5-325 MG per tablet  Take 1 tablet by mouth every 4 hours as needed for Pain for up to 3 days. ibuprofen (ADVIL;MOTRIN) 400 MG tablet  Take 1 tablet by mouth every 8 hours as needed for Pain             lidocaine 4 % external patch  Place 1 patch onto the skin daily for 20 days Can stop once pain subsides                 Objective Findings at Discharge:   BP (!) 140/65   Pulse 87   Temp 97.7 °F (36.5 °C) (Oral)   Resp 18   Ht 6' 5\" (1.956 m)   Wt (!) 540 lb (244.9 kg)   SpO2 96%   BMI 64.03 kg/m²            PHYSICAL EXAM   GEN Awake male, sitting upright in bed in no apparent distress. Appears given age. EYES Pupils are equally round. No scleral erythema, discharge, or conjunctivitis. HENT Mucous membranes are moist.   NECK No apparent thyromegaly or masses. RESP Clear to auscultation, no wheezes, rales or rhonchi. Symmetric chest movement while on room air. CARDIO/VASC S1/S2 auscultated. Regular rate without appreciable murmurs, rubs, or gallops. Peripheral pulses equal bilaterally and palpable. No peripheral edema. GI Abdomen is soft without significant tenderness, masses, or guarding. Bowel sounds are normoactive. Rectal exam deferred.  Luu catheter is not present. HEME/LYMPH No petechiae or ecchymoses. MSK No gross joint deformities. Spontaneous movement of all extremities  SKIN Normal coloration, warm, dry. NEURO Cranial nerves appear grossly intact, normal speech, no lateralizing weakness. PSYCH Awake, alert, oriented x 4. Affect appropriate.       BMP/CBC  Recent Labs     12/01/20 2058      K 4.1   CL 99   CO2 25   BUN 11   CREATININE 0.9   WBC 9.0   HCT 45.0          IMAGING:  Xr Knee Right (min 4 Views)    Result Date: 12/1/2020  EXAMINATION: FOUR XRAY VIEWS OF THE RIGHT KNEE; 4 XRAY VIEWS OF THE RIGHT TIBIA AND FIBULA 12/1/2020 6:20 pm COMPARISON: None. HISTORY: ORDERING SYSTEM PROVIDED HISTORY: injury TECHNOLOGIST PROVIDED HISTORY: PORTABLE Reason for exam:->injury Reason for Exam: right knee pain Acuity: Acute Type of Exam: Initial Mechanism of Injury: fall Relevant Medical/Surgical History: na FINDINGS: Right knee- The alignment of the right knee is normal.  There is no acute fracture or dislocation. There is no significant arthropathy. No joint effusion is seen. Right tibia and fibula- The alignment of the tibia and fibula is normal.  There is no acute fracture or dislocation. The soft tissues are unremarkable. No acute osseous injury of the right tibia or fibula. Xr Tibia Fibula Right (2 Views)    Result Date: 12/1/2020  EXAMINATION: FOUR XRAY VIEWS OF THE RIGHT KNEE; 4 XRAY VIEWS OF THE RIGHT TIBIA AND FIBULA 12/1/2020 6:20 pm COMPARISON: None. HISTORY: ORDERING SYSTEM PROVIDED HISTORY: injury TECHNOLOGIST PROVIDED HISTORY: PORTABLE Reason for exam:->injury Reason for Exam: right knee pain Acuity: Acute Type of Exam: Initial Mechanism of Injury: fall Relevant Medical/Surgical History: na FINDINGS: Right knee- The alignment of the right knee is normal.  There is no acute fracture or dislocation. There is no significant arthropathy. No joint effusion is seen. Right tibia and fibula- The alignment of the tibia and fibula is normal.  There is no acute fracture or dislocation. The soft tissues are unremarkable. No acute osseous injury of the right tibia or fibula.      Discharge Time of 28 minutes    Electronically signed by Mayra Simmons MD on 12/2/2020 at 3:58 PM

## 2020-12-02 NOTE — CONSULTS
[address] [facility],2[address] Floor ACUTE CARE PHYSICAL THERAPY EVALUATION  Michelle Martin, 1990, 1110/1110-A, 12/2/2020    History  [place]:  The primary encounter diagnosis was Acute pain of right knee. Diagnoses of Fall due to slipping on ice or snow, initial encounter and Unable to ambulate were also pertinent to this visit. Patient  has a past medical history of Abscess, ADHD (attention deficit hyperactivity disorder), Chronic back pain, Morbid obesity ([address] and Scoliosis. Patient  has a past surgical history that includes Foot surgery. Subjective:  Patient states:  \"I can't put weight on my right leg. \"    Pain:  7/10 R knee. Communication with other providers:  Handoff to RN, OT  Restrictions: general precautions, fall risk    Home Setup/Prior level of function  Social/Functional History  Lives With: Spouse  Type of Home: House  Home Layout: One level  Home Access: Stairs to enter without rails  Entrance Stairs - Number of Steps: 2  Bathroom Shower/Tub: Tub/Shower unit  Bathroom Toilet: Standard  ADL Assistance: Independent  Homemaking Assistance: Independent  Homemaking Responsibilities: Yes  Ambulation Assistance: Independent  Transfer Assistance: Independent  Active : Yes  Occupation: Full time employment  Type of occupation:     Examination of body systems (includes body structures/functions, activity/participation limitations):  · Observation:  Pt supine in bed upon arrival and agreeable to therapy  · Vision:  [place] [facility] [facility] [place]  · Hearing:  [facility] [place]  · Cardiopulmonary:  3L O2 when sleeping  · Cognition: WFL, see OT/SLP note for further evaluation. Musculoskeletal  · ROM R/L:  R knee very limited flexion due to pain, all else [facility] [place]. · Strength R: 2-/5 at knee limited by pain, all else [facility], moderate impairment in function and endurance.     · Neuro:  WFL      Mobility:  · Rolling L/R:  Supervision with increased time and effort to complete  · Supine to sit:  Supervision with exception of PM

## 2024-08-28 NOTE — PROGRESS NOTES
Medication History  [facility]    Patient Name: Cristiana Phillips 1990     Medication history has been completed by: Mehul Topete CPhT    Source(s) of information: patient      Primary Care Physician: JENIFER Richey - CNP     Pharmacy: [facility] [place]    Allergies as of 12/01/2020 - Review Complete 12/01/2020   Allergen Reaction Noted    Cinnamon Shortness Of Breath 02/10/2017        Prior to Admission medications    Medication Sig Start Date End Date Taking?  Authorizing Provider     Medications removed from list (include reason, ex. noncompliance, medication cost, therapy complete etc.):   Naproxen patient states he is not taking this    Comments:  Patient states he takes no medications    To my knowledge the above medication history is accurate as of 12/2/2020 9:37 AM.   Mehul Topete CPhT   12/2/2020 9:37 AM 4 = No assist / stand by assistance